# Patient Record
Sex: FEMALE | Race: BLACK OR AFRICAN AMERICAN | URBAN - METROPOLITAN AREA
[De-identification: names, ages, dates, MRNs, and addresses within clinical notes are randomized per-mention and may not be internally consistent; named-entity substitution may affect disease eponyms.]

---

## 2020-05-12 ENCOUNTER — NEW PATIENT (OUTPATIENT)
Dept: URBAN - METROPOLITAN AREA CLINIC 44 | Facility: CLINIC | Age: 70
End: 2020-05-12
Payer: MEDICARE

## 2020-05-12 DIAGNOSIS — Z79.84 LONG TERM (CURRENT) USE OF ORAL ANTIDIABETIC DRUGS: ICD-10-CM

## 2020-05-12 DIAGNOSIS — H25.813 COMBINED FORMS OF AGE-RELATED CATARACT, BILATERAL: ICD-10-CM

## 2020-05-12 PROCEDURE — 92004 COMPRE OPH EXAM NEW PT 1/>: CPT | Performed by: OPHTHALMOLOGY

## 2020-05-12 PROCEDURE — 92015 DETERMINE REFRACTIVE STATE: CPT | Performed by: OPHTHALMOLOGY

## 2020-05-12 ASSESSMENT — VISUAL ACUITY
OD: 20/20
OS: 20/20

## 2020-05-12 ASSESSMENT — KERATOMETRY
OD: 44.38
OS: 43.63

## 2020-05-12 ASSESSMENT — INTRAOCULAR PRESSURE
OS: 18
OD: 18

## 2021-05-04 ENCOUNTER — OFFICE VISIT (OUTPATIENT)
Dept: URBAN - METROPOLITAN AREA CLINIC 44 | Facility: CLINIC | Age: 71
End: 2021-05-04
Payer: MEDICARE

## 2021-05-04 DIAGNOSIS — E11.9 TYPE 2 DIABETES MELLITUS WITHOUT COMPLICATIONS: ICD-10-CM

## 2021-05-04 PROCEDURE — 92134 CPTRZ OPH DX IMG PST SGM RTA: CPT | Performed by: OPHTHALMOLOGY

## 2021-05-04 PROCEDURE — 99214 OFFICE O/P EST MOD 30 MIN: CPT | Performed by: OPHTHALMOLOGY

## 2021-05-04 PROCEDURE — 92250 FUNDUS PHOTOGRAPHY W/I&R: CPT | Performed by: OPHTHALMOLOGY

## 2021-05-04 ASSESSMENT — KERATOMETRY
OD: 44.38
OS: 43.38

## 2021-05-04 ASSESSMENT — INTRAOCULAR PRESSURE
OD: 15
OS: 17

## 2021-05-04 ASSESSMENT — VISUAL ACUITY
OS: 20/30
OD: 20/25

## 2021-05-04 NOTE — IMPRESSION/PLAN
Impression: Combined forms of age-related cataract, bilateral Plan: Risks and benefits of surgical treatment were discussed and understood. Patient defers surgical treatment      . Discussed cataract diagnosis with the patient. 


rv 3-6 months cat eval